# Patient Record
Sex: FEMALE | Race: WHITE | NOT HISPANIC OR LATINO | URBAN - METROPOLITAN AREA
[De-identification: names, ages, dates, MRNs, and addresses within clinical notes are randomized per-mention and may not be internally consistent; named-entity substitution may affect disease eponyms.]

---

## 2018-11-06 ENCOUNTER — APPOINTMENT (RX ONLY)
Dept: URBAN - METROPOLITAN AREA OTHER 6 | Facility: OTHER | Age: 68
Setting detail: DERMATOLOGY
End: 2018-11-06

## 2018-11-06 DIAGNOSIS — L73.9 FOLLICULAR DISORDER, UNSPECIFIED: ICD-10-CM

## 2018-11-06 DIAGNOSIS — L663 OTHER SPECIFIED DISEASES OF HAIR AND HAIR FOLLICLES: ICD-10-CM

## 2018-11-06 DIAGNOSIS — L82.1 OTHER SEBORRHEIC KERATOSIS: ICD-10-CM

## 2018-11-06 DIAGNOSIS — L738 OTHER SPECIFIED DISEASES OF HAIR AND HAIR FOLLICLES: ICD-10-CM

## 2018-11-06 DIAGNOSIS — L81.4 OTHER MELANIN HYPERPIGMENTATION: ICD-10-CM

## 2018-11-06 PROBLEM — L02.02 FURUNCLE OF FACE: Status: ACTIVE | Noted: 2018-11-06

## 2018-11-06 PROBLEM — I10 ESSENTIAL (PRIMARY) HYPERTENSION: Status: ACTIVE | Noted: 2018-11-06

## 2018-11-06 PROCEDURE — ? ADDITIONAL NOTES

## 2018-11-06 PROCEDURE — ? COUNSELING

## 2018-11-06 PROCEDURE — 99213 OFFICE O/P EST LOW 20 MIN: CPT

## 2018-11-06 ASSESSMENT — LOCATION SIMPLE DESCRIPTION DERM
LOCATION SIMPLE: RIGHT TEMPLE
LOCATION SIMPLE: RIGHT ZYGOMA
LOCATION SIMPLE: LEFT TEMPLE
LOCATION SIMPLE: LEFT CHEEK

## 2018-11-06 ASSESSMENT — LOCATION ZONE DERM
LOCATION ZONE: FACE
LOCATION ZONE: FACE

## 2018-11-06 ASSESSMENT — LOCATION DETAILED DESCRIPTION DERM
LOCATION DETAILED: RIGHT CENTRAL TEMPLE
LOCATION DETAILED: LEFT LATERAL TEMPLE
LOCATION DETAILED: LEFT SUPERIOR LATERAL MALAR CHEEK
LOCATION DETAILED: LEFT INFERIOR LATERAL MALAR CHEEK
LOCATION DETAILED: RIGHT LATERAL TEMPLE
LOCATION DETAILED: RIGHT LATERAL ZYGOMA

## 2021-07-22 ENCOUNTER — OFFICE VISIT (OUTPATIENT)
Dept: URBAN - METROPOLITAN AREA CLINIC 27 | Facility: CLINIC | Age: 71
End: 2021-07-22

## 2021-07-22 PROBLEM — 428283002 HISTORY OF POLYP OF COLON (SITUATION): Status: ACTIVE | Noted: 2021-07-22

## 2021-07-22 PROBLEM — 40739000 DYSPHAGIA: Status: ACTIVE | Noted: 2021-07-22

## 2021-08-16 ENCOUNTER — OFFICE VISIT (OUTPATIENT)
Dept: URBAN - METROPOLITAN AREA SURGERY CENTER 7 | Facility: SURGERY CENTER | Age: 71
End: 2021-08-16

## 2021-09-14 ENCOUNTER — OFFICE VISIT (OUTPATIENT)
Dept: URBAN - METROPOLITAN AREA CLINIC 27 | Facility: CLINIC | Age: 71
End: 2021-09-14

## 2021-09-14 PROBLEM — 449671000124104 LONG-TERM CURRENT USE OF ANTITHROMBOTIC: Status: ACTIVE | Noted: 2021-09-14

## 2021-09-14 PROBLEM — 414916001 OBESITY: Status: ACTIVE | Noted: 2021-09-14

## 2021-09-14 PROBLEM — 275978004 SCREENING FOR MALIGNANT NEOPLASM OF COLON: Status: INACTIVE | Noted: 2021-09-14

## 2021-09-14 PROBLEM — 449681000124101 LONG-TERM CURRENT USE OF ANTIPLATELET DRUG: Status: ACTIVE | Noted: 2021-09-14

## 2021-09-14 PROBLEM — 313436004 TYPE II DIABETES MELLITUS WITHOUT COMPLICATION: Status: ACTIVE | Noted: 2021-09-14

## 2021-10-28 ENCOUNTER — OFFICE VISIT (OUTPATIENT)
Dept: URBAN - METROPOLITAN AREA SURGERY CENTER 7 | Facility: SURGERY CENTER | Age: 71
End: 2021-10-28

## 2022-02-17 ENCOUNTER — OFFICE VISIT (OUTPATIENT)
Dept: URBAN - METROPOLITAN AREA CLINIC 27 | Facility: CLINIC | Age: 72
End: 2022-02-17

## 2022-04-30 ENCOUNTER — TELEPHONE ENCOUNTER (OUTPATIENT)
Dept: URBAN - METROPOLITAN AREA CLINIC 121 | Facility: CLINIC | Age: 72
End: 2022-04-30

## 2022-04-30 RX ORDER — VALSARTAN/HYDROCHLOROTHIAZIDE 80-12.5MG
1 PO QD TABLET ORAL
OUTPATIENT
Start: 2008-01-31 | End: 2022-02-17

## 2022-04-30 RX ORDER — VALSARTAN/HYDROCHLOROTHIAZIDE 80-12.5MG
1 PO QD TABLET ORAL
OUTPATIENT
Start: 2008-01-31

## 2022-04-30 RX ORDER — PANTOPRAZOLE SODIUM 40 MG/1
1 TABLET PO BID X 2 WEEKS, THEN 1 PO QD TABLET, DELAYED RELEASE ORAL
OUTPATIENT
Start: 2021-08-16

## 2022-04-30 RX ORDER — SULFAMETHOXAZOLE/TRIMETHOPRIM 400MG-80MG
TABLET ORAL
OUTPATIENT
Start: 2021-08-16 | End: 2022-02-17

## 2022-04-30 RX ORDER — SULFAMETHOXAZOLE/TRIMETHOPRIM 400MG-80MG
TABLET ORAL
OUTPATIENT
Start: 2021-08-16

## 2022-04-30 RX ORDER — OXYCODONE AND ACETAMINOPHEN 325; 10 MG/1; MG/1
TABLET ORAL
OUTPATIENT
Start: 2021-07-22

## 2022-04-30 RX ORDER — POLYETHYLENE GLYCOL-3350 AND ELECTROLYTES 236; 6.74; 5.86; 2.97; 22.74 G/274.31G; G/274.31G; G/274.31G; G/274.31G; G/274.31G
TAKE BY MOUTH AS DIRECTED MIX AND USE AS DIRECTED POWDER, FOR SOLUTION ORAL
OUTPATIENT
Start: 2021-09-14

## 2022-04-30 RX ORDER — PANTOPRAZOLE SODIUM 40 MG/1
1 TABLET PO BID X 2 WEEKS, THEN 1 PO QD TABLET, DELAYED RELEASE ORAL
OUTPATIENT
Start: 2021-08-16 | End: 2022-02-17

## 2022-04-30 RX ORDER — OXYCODONE AND ACETAMINOPHEN 325; 10 MG/1; MG/1
TABLET ORAL
OUTPATIENT
Start: 2021-07-22 | End: 2022-02-17

## 2022-04-30 RX ORDER — POLYETHYLENE GLYCOL-3350 AND ELECTROLYTES 236; 6.74; 5.86; 2.97; 22.74 G/274.31G; G/274.31G; G/274.31G; G/274.31G; G/274.31G
TAKE BY MOUTH AS DIRECTED MIX AND USE AS DIRECTED POWDER, FOR SOLUTION ORAL
OUTPATIENT
Start: 2021-09-14 | End: 2021-10-29

## 2022-05-01 ENCOUNTER — TELEPHONE ENCOUNTER (OUTPATIENT)
Dept: URBAN - METROPOLITAN AREA CLINIC 121 | Facility: CLINIC | Age: 72
End: 2022-05-01

## 2022-05-01 RX ORDER — LEVOTHYROXINE SODIUM 75 MCG
QD TABLET ORAL
Status: ACTIVE | COMMUNITY
Start: 2013-08-19

## 2022-05-01 RX ORDER — ASPIRIN 81 MG/1
1 PO QD TABLET ORAL
Status: ACTIVE | COMMUNITY
Start: 2008-01-31

## 2022-05-01 RX ORDER — CELECOXIB 200 MG
1 PO QD PRN CAPSULE ORAL
Status: ACTIVE | COMMUNITY
Start: 2008-01-31

## 2022-05-01 RX ORDER — ERGOCALCIFEROL 1.25 MG/1
CAPSULE, LIQUID FILLED ORAL
Status: ACTIVE | COMMUNITY
Start: 2021-07-22

## 2022-05-01 RX ORDER — SITAGLIPTIN PHOSPHATE 100 MG
TABLET ORAL
Status: ACTIVE | COMMUNITY
Start: 2021-07-22

## 2022-05-01 RX ORDER — FAMOTIDINE 20 MG/1
1 PO QD PRN TABLET, FILM COATED ORAL
Status: ACTIVE | COMMUNITY
Start: 2008-01-31

## 2022-05-01 RX ORDER — PANTOPRAZOLE SODIUM 40 MG/1
TAKE 1 TABLET EVERY MORNING 30 MINUTES BEFORE BREAKFAST TABLET, DELAYED RELEASE ORAL
Status: ACTIVE | COMMUNITY
Start: 2022-02-17 | End: 2023-02-12

## 2022-05-01 RX ORDER — VALSARTAN 320 MG/1
TABLET ORAL
Status: ACTIVE | COMMUNITY
Start: 2021-07-22

## 2022-05-01 RX ORDER — CARVEDILOL 25 MG/1
TABLET, FILM COATED ORAL
Status: ACTIVE | COMMUNITY
Start: 2021-07-22

## 2022-05-01 RX ORDER — APIXABAN 5 MG/1
TABLET, FILM COATED ORAL
Status: ACTIVE | COMMUNITY
Start: 2021-07-22

## 2022-08-12 ENCOUNTER — WEB ENCOUNTER (OUTPATIENT)
Dept: URBAN - METROPOLITAN AREA CLINIC 27 | Facility: CLINIC | Age: 72
End: 2022-08-12

## 2022-08-17 ENCOUNTER — TELEPHONE ENCOUNTER (OUTPATIENT)
Dept: URBAN - METROPOLITAN AREA CLINIC 27 | Facility: CLINIC | Age: 72
End: 2022-08-17

## 2022-08-17 ENCOUNTER — WEB ENCOUNTER (OUTPATIENT)
Dept: URBAN - METROPOLITAN AREA CLINIC 27 | Facility: CLINIC | Age: 72
End: 2022-08-17

## 2022-08-17 ENCOUNTER — OFFICE VISIT (OUTPATIENT)
Dept: URBAN - METROPOLITAN AREA CLINIC 27 | Facility: CLINIC | Age: 72
End: 2022-08-17
Payer: MEDICARE

## 2022-08-17 VITALS
BODY MASS INDEX: 32.58 KG/M2 | WEIGHT: 215 LBS | TEMPERATURE: 97.1 F | SYSTOLIC BLOOD PRESSURE: 135 MMHG | DIASTOLIC BLOOD PRESSURE: 90 MMHG | HEIGHT: 68 IN | HEART RATE: 73 BPM

## 2022-08-17 DIAGNOSIS — K59.09 CONSTIPATION: ICD-10-CM

## 2022-08-17 DIAGNOSIS — E66.8 OTHER OBESITY: ICD-10-CM

## 2022-08-17 DIAGNOSIS — K21.9 GERD: ICD-10-CM

## 2022-08-17 DIAGNOSIS — R13.10 DYSPHAGIA: ICD-10-CM

## 2022-08-17 PROBLEM — 40739000 DYSPHAGIA: Status: ACTIVE | Noted: 2022-08-17

## 2022-08-17 PROBLEM — 414916001 OBESITY: Status: ACTIVE | Noted: 2022-08-17

## 2022-08-17 PROBLEM — 235595009 GASTROESOPHAGEAL REFLUX DISEASE: Status: ACTIVE | Noted: 2022-08-17

## 2022-08-17 PROCEDURE — 99213 OFFICE O/P EST LOW 20 MIN: CPT | Performed by: INTERNAL MEDICINE

## 2022-08-17 RX ORDER — ERGOCALCIFEROL 1.25 MG/1
CAPSULE, LIQUID FILLED ORAL
Status: ACTIVE | COMMUNITY
Start: 2021-07-22

## 2022-08-17 RX ORDER — VALSARTAN 320 MG/1
TABLET ORAL
Status: ACTIVE | COMMUNITY
Start: 2021-07-22

## 2022-08-17 RX ORDER — PANTOPRAZOLE SODIUM 40 MG/1
1 TABLET TABLET, DELAYED RELEASE ORAL
Qty: 60 TABLET | Refills: 4 | OUTPATIENT

## 2022-08-17 RX ORDER — SITAGLIPTIN PHOSPHATE 100 MG
TABLET ORAL
Status: ACTIVE | COMMUNITY
Start: 2021-07-22

## 2022-08-17 RX ORDER — CARVEDILOL 25 MG/1
TABLET, FILM COATED ORAL
Status: ACTIVE | COMMUNITY
Start: 2021-07-22

## 2022-08-17 RX ORDER — CELECOXIB 200 MG
1 PO QD PRN CAPSULE ORAL
Status: ACTIVE | COMMUNITY
Start: 2008-01-31

## 2022-08-17 RX ORDER — ASPIRIN 81 MG/1
1 PO QD TABLET ORAL
Status: ACTIVE | COMMUNITY
Start: 2008-01-31

## 2022-08-17 RX ORDER — FAMOTIDINE 20 MG/1
1 PO QD PRN TABLET, FILM COATED ORAL
Status: ACTIVE | COMMUNITY
Start: 2008-01-31

## 2022-08-17 RX ORDER — LEVOTHYROXINE SODIUM 75 MCG
QD TABLET ORAL
Status: ACTIVE | COMMUNITY
Start: 2013-08-19

## 2022-08-17 RX ORDER — PANTOPRAZOLE SODIUM 40 MG/1
TAKE 1 TABLET EVERY MORNING 30 MINUTES BEFORE BREAKFAST TABLET, DELAYED RELEASE ORAL
Status: ACTIVE | COMMUNITY
Start: 2022-02-17 | End: 2023-02-12

## 2022-08-17 RX ORDER — APIXABAN 5 MG/1
TABLET, FILM COATED ORAL
Status: ACTIVE | COMMUNITY
Start: 2021-07-22

## 2022-08-17 NOTE — HPI-TODAY'S VISIT:
Patient here for follow-up of reflux and dysphagia >1y. She is doing fairly well at present. Her reflux symptoms are suboptimally-controlled with pantoprazole 40mg in the morning and bedtime Pepcid. She will occasionally (ie twice a week) wake up in the middle of the night with pyrosis and need to take Tums. She is not adherent to an anti-reflux regimen. She denies any dysphagia since undergoing esophageal dilation last year. Her constipation is well-controlled with MiraLAX twice a week. Her water and fiber intake are adequate. She has not been able to lose any weight. She has no other GI symptoms currently. She underwent colonoscopy 10/2021; one adenomatous polyp was removed. The prep was somewhat suboptimal. Adenomatous polyps were also removed during a 2013 and 2008 colonoscopy. She underwent upper endoscopy in the summer of 2021 which revealed moderate reflux esophagitis with linear erosions extending from the GEJ into the mid-esophagus. Truong and Savary dilation of the esophagus was performed with some resistance. Moderate nonerosive gastritis was present. The pylorus was markedly patulous, and mild duodenitis was present. Esophageal biopsies revealed mild chronic esophagitis; gastric biopsies were negative for H pylori. She was switched from Pepcid to pantoprazole following that exam. There is no family history of colon cancer or polyps.

## 2022-08-18 ENCOUNTER — DASHBOARD ENCOUNTERS (OUTPATIENT)
Age: 72
End: 2022-08-18

## 2022-09-21 ENCOUNTER — WEB ENCOUNTER (OUTPATIENT)
Dept: URBAN - METROPOLITAN AREA CLINIC 27 | Facility: CLINIC | Age: 72
End: 2022-09-21

## 2022-10-24 ENCOUNTER — WEB ENCOUNTER (OUTPATIENT)
Dept: URBAN - METROPOLITAN AREA CLINIC 27 | Facility: CLINIC | Age: 72
End: 2022-10-24

## 2023-01-25 ENCOUNTER — ERX REFILL RESPONSE (OUTPATIENT)
Dept: URBAN - METROPOLITAN AREA CLINIC 27 | Facility: CLINIC | Age: 73
End: 2023-01-25

## 2023-01-25 RX ORDER — PANTOPRAZOLE SODIUM 40 MG/1
TAKE 1 TABLET BY MOUTH TWICE DAILY TABLET, DELAYED RELEASE ORAL
Qty: 60 TABLET | Refills: 5 | OUTPATIENT

## 2023-01-25 RX ORDER — PANTOPRAZOLE SODIUM 40 MG/1
TAKE 1 TABLET BY MOUTH TWICE DAILY TABLET, DELAYED RELEASE ORAL
Qty: 60 TABLET | Refills: 0 | OUTPATIENT

## 2023-02-21 ENCOUNTER — APPOINTMENT (RX ONLY)
Dept: URBAN - METROPOLITAN AREA OTHER 6 | Facility: OTHER | Age: 73
Setting detail: DERMATOLOGY
End: 2023-02-21

## 2023-02-21 DIAGNOSIS — L71.8 OTHER ROSACEA: ICD-10-CM

## 2023-02-21 PROCEDURE — ? COUNSELING

## 2023-02-21 PROCEDURE — 99204 OFFICE O/P NEW MOD 45 MIN: CPT

## 2023-02-21 PROCEDURE — ? PRESCRIPTION

## 2023-02-21 PROCEDURE — ? PRESCRIPTION MEDICATION MANAGEMENT

## 2023-02-21 RX ORDER — METRONIDAZOLE 10 MG/G
GEL TOPICAL AS DIRECTED
Qty: 60 | Refills: 1 | Status: ERX | COMMUNITY
Start: 2023-02-21

## 2023-02-21 RX ORDER — HYDROCORTISONE 25 MG/G
CREAM TOPICAL AS DIRECTED
Qty: 30 | Refills: 1 | Status: ERX | COMMUNITY
Start: 2023-02-21

## 2023-02-21 RX ADMIN — HYDROCORTISONE: 25 CREAM TOPICAL at 00:00

## 2023-02-21 RX ADMIN — METRONIDAZOLE: 10 GEL TOPICAL at 00:00

## 2023-02-21 ASSESSMENT — LOCATION SIMPLE DESCRIPTION DERM
LOCATION SIMPLE: RIGHT CHEEK
LOCATION SIMPLE: LEFT CHEEK

## 2023-02-21 ASSESSMENT — LOCATION ZONE DERM: LOCATION ZONE: FACE

## 2023-02-21 ASSESSMENT — LOCATION DETAILED DESCRIPTION DERM
LOCATION DETAILED: RIGHT CENTRAL MALAR CHEEK
LOCATION DETAILED: LEFT INFERIOR MEDIAL MALAR CHEEK

## 2023-02-21 NOTE — PROCEDURE: PRESCRIPTION MEDICATION MANAGEMENT
Detail Level: Simple
Initiate Treatment: Metrogel 1 % topical \\nApply to face every night at bedtime.\\n\\nhydrocortisone 2.5 % topical cream \\nApply a thin film to affected areas of face twice a day for up to two weeks. Then use as needed for flares.
Render In Strict Bullet Format?: Yes

## 2023-04-26 ENCOUNTER — APPOINTMENT (RX ONLY)
Dept: URBAN - METROPOLITAN AREA OTHER 6 | Facility: OTHER | Age: 73
Setting detail: DERMATOLOGY
End: 2023-04-26

## 2023-04-26 DIAGNOSIS — L71.8 OTHER ROSACEA: ICD-10-CM | Status: IMPROVED

## 2023-04-26 DIAGNOSIS — L82.0 INFLAMED SEBORRHEIC KERATOSIS: ICD-10-CM

## 2023-04-26 DIAGNOSIS — L21.8 OTHER SEBORRHEIC DERMATITIS: ICD-10-CM

## 2023-04-26 PROBLEM — D48.5 NEOPLASM OF UNCERTAIN BEHAVIOR OF SKIN: Status: ACTIVE | Noted: 2023-04-26

## 2023-04-26 PROCEDURE — 11301 SHAVE SKIN LESION 0.6-1.0 CM: CPT

## 2023-04-26 PROCEDURE — 99214 OFFICE O/P EST MOD 30 MIN: CPT | Mod: 25

## 2023-04-26 PROCEDURE — ? PRESCRIPTION

## 2023-04-26 PROCEDURE — ? SHAVE REMOVAL

## 2023-04-26 PROCEDURE — ? COUNSELING

## 2023-04-26 PROCEDURE — ? PRESCRIPTION MEDICATION MANAGEMENT

## 2023-04-26 RX ORDER — HYDROCORTISONE 25 MG/G
CREAM TOPICAL AS DIRECTED
Qty: 30 | Refills: 2 | Status: ERX

## 2023-04-26 RX ORDER — METRONIDAZOLE 7.5 MG/G
CREAM TOPICAL QD
Qty: 45 | Refills: 3 | Status: ERX | COMMUNITY
Start: 2023-04-26

## 2023-04-26 RX ORDER — KETOCONAZOLE 20 MG/ML
SHAMPOO, SUSPENSION TOPICAL
Qty: 120 | Refills: 3 | Status: ERX | COMMUNITY
Start: 2023-04-26

## 2023-04-26 RX ADMIN — KETOCONAZOLE: 20 SHAMPOO, SUSPENSION TOPICAL at 00:00

## 2023-04-26 RX ADMIN — METRONIDAZOLE: 7.5 CREAM TOPICAL at 00:00

## 2023-04-26 ASSESSMENT — LOCATION DETAILED DESCRIPTION DERM
LOCATION DETAILED: LEFT PROXIMAL DORSAL FOREARM
LOCATION DETAILED: LEFT CENTRAL MALAR CHEEK
LOCATION DETAILED: LEFT MEDIAL MALAR CHEEK

## 2023-04-26 ASSESSMENT — SEVERITY ASSESSMENT: HOW SEVERE IS THIS PATIENT'S CONDITION?: MODERATE

## 2023-04-26 ASSESSMENT — LOCATION SIMPLE DESCRIPTION DERM
LOCATION SIMPLE: LEFT CHEEK
LOCATION SIMPLE: LEFT FOREARM

## 2023-04-26 ASSESSMENT — LOCATION ZONE DERM
LOCATION ZONE: ARM
LOCATION ZONE: FACE

## 2023-04-26 ASSESSMENT — SEVERITY ASSESSMENT OVERALL AMONG ALL PATIENTS
IN YOUR EXPERIENCE, AMONG ALL PATIENTS YOU HAVE SEEN WITH THIS CONDITION, HOW SEVERE IS THIS PATIENT'S CONDITION?: MILD TO MODERATE

## 2023-04-26 NOTE — PROCEDURE: SHAVE REMOVAL
Medical Necessity Information: It is in your best interest to select a reason for this procedure from the list below. All of these items fulfill various CMS LCD requirements except the new and changing color options.
Medical Necessity Clause: This procedure was medically necessary because the lesion that was treated was:irritated
Lab: [id number]
Lab Facility: [id number]
Detail Level: Detailed
Was A Bandage Applied: Yes
Size Of Lesion In Cm (Required): 1
X Size Of Lesion In Cm (Optional): 0
Depth Of Shave: dermis
Biopsy Method: 15 blade
Anesthesia Type: 1% lidocaine without epinephrine
Anesthesia Volume In Cc: 2
Hemostasis: Aluminum Chloride and Electrocautery
Wound Care: Mupirocin
Render Path Notes In Note?: No
Consent was obtained from the patient. The risks and benefits to therapy were discussed in detail. Specifically, the risks of infection, scarring, bleeding, prolonged wound healing, incomplete removal, allergy to anesthesia, nerve injury and recurrence were addressed. Prior to the procedure, the treatment site was clearly identified and confirmed by the patient. All components of Universal Protocol/PAUSE Rule completed.
Post-Care Instructions: I reviewed with the patient in detail post-care instructions. Patient is to keep the biopsy site dry overnight, and then apply bacitracin twice daily until healed. Patient may apply hydrogen peroxide soaks to remove any crusting.
Notification Instructions: Patient will be notified of pathology results. However, patient instructed to call the office if not contacted within 2 weeks.
Billing Type: Third-Party Bill

## 2023-04-26 NOTE — PROCEDURE: PRESCRIPTION MEDICATION MANAGEMENT
Detail Level: Simple
Render In Strict Bullet Format?: Yes
Initiate Treatment: Ketoconazole shampoo as directed
Continue Regimen: Metronidazole gel 0.75% qd\\nHydrocortisone cream prn

## 2023-08-02 ENCOUNTER — APPOINTMENT (RX ONLY)
Dept: URBAN - METROPOLITAN AREA OTHER 6 | Facility: OTHER | Age: 73
Setting detail: DERMATOLOGY
End: 2023-08-02

## 2023-08-02 DIAGNOSIS — D22 MELANOCYTIC NEVI: ICD-10-CM

## 2023-08-02 DIAGNOSIS — L82.1 OTHER SEBORRHEIC KERATOSIS: ICD-10-CM

## 2023-08-02 DIAGNOSIS — D18.0 HEMANGIOMA: ICD-10-CM

## 2023-08-02 DIAGNOSIS — L71.8 OTHER ROSACEA: ICD-10-CM

## 2023-08-02 PROBLEM — D22.5 MELANOCYTIC NEVI OF TRUNK: Status: ACTIVE | Noted: 2023-08-02

## 2023-08-02 PROBLEM — D18.01 HEMANGIOMA OF SKIN AND SUBCUTANEOUS TISSUE: Status: ACTIVE | Noted: 2023-08-02

## 2023-08-02 PROCEDURE — 99214 OFFICE O/P EST MOD 30 MIN: CPT

## 2023-08-02 PROCEDURE — ? PRESCRIPTION MEDICATION MANAGEMENT

## 2023-08-02 PROCEDURE — ? COUNSELING

## 2023-08-02 ASSESSMENT — LOCATION SIMPLE DESCRIPTION DERM
LOCATION SIMPLE: RIGHT LOWER BACK
LOCATION SIMPLE: LEFT UPPER BACK
LOCATION SIMPLE: RIGHT CHEEK
LOCATION SIMPLE: LEFT CHEEK
LOCATION SIMPLE: RIGHT UPPER BACK

## 2023-08-02 ASSESSMENT — LOCATION ZONE DERM
LOCATION ZONE: TRUNK
LOCATION ZONE: FACE

## 2023-08-02 ASSESSMENT — LOCATION DETAILED DESCRIPTION DERM
LOCATION DETAILED: RIGHT SUPERIOR UPPER BACK
LOCATION DETAILED: RIGHT SUPERIOR MEDIAL MIDBACK
LOCATION DETAILED: RIGHT CENTRAL MALAR CHEEK
LOCATION DETAILED: LEFT MID-UPPER BACK
LOCATION DETAILED: LEFT INFERIOR MEDIAL MALAR CHEEK

## 2023-08-02 NOTE — HPI: FULL BODY SKIN EXAMINATION
What Type Of Note Output Would You Prefer (Optional)?: Bullet Format
What Is The Reason For Today's Visit?: Full Body Skin Examination
What Is The Reason For Today's Visit? (Being Monitored For X): the development of a new lesion
Additional History: Most recent PE last 3-4 months - WNL. No weight loss. No swollen glands. No health changes.

## 2023-08-02 NOTE — PROCEDURE: PRESCRIPTION MEDICATION MANAGEMENT
Detail Level: Simple
Continue Regimen: Metrogel 1 % topical \\nApply to face every night at bedtime.\\n\\nhydrocortisone 2.5 % topical cream \\nApply a thin film to affected areas of face twice a day for up to two weeks. Then use as needed for flares.
Render In Strict Bullet Format?: Yes